# Patient Record
Sex: MALE | Race: BLACK OR AFRICAN AMERICAN | NOT HISPANIC OR LATINO | URBAN - METROPOLITAN AREA
[De-identification: names, ages, dates, MRNs, and addresses within clinical notes are randomized per-mention and may not be internally consistent; named-entity substitution may affect disease eponyms.]

---
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
## 2022-08-28 NOTE — ED PROVIDER NOTE - CPE EDP ENMT NORM
normal (ped)... [Follow-Up] : a follow-up visit [FreeTextEntry1] : for Periodic Monthly Evaluation, homebound due to COPD